# Patient Record
Sex: FEMALE | Employment: UNEMPLOYED | URBAN - METROPOLITAN AREA
[De-identification: names, ages, dates, MRNs, and addresses within clinical notes are randomized per-mention and may not be internally consistent; named-entity substitution may affect disease eponyms.]

---

## 2024-08-26 ENCOUNTER — NURSE TRIAGE (OUTPATIENT)
Age: 39
End: 2024-08-26

## 2024-08-26 NOTE — TELEPHONE ENCOUNTER
Regarding: small lump in vulva  ----- Message from Gracie RAMIREZ sent at 8/26/2024 11:03 AM EDT -----  I meant .    ----- Message from Gracie RAMIREZ sent at 8/26/2024 11:03 AM EDT -----  Np requested .      Patient has small lump in vulva area.      New to area.My soonest in all location with her is 9/20. I scheduled

## 2024-08-28 ENCOUNTER — NURSE TRIAGE (OUTPATIENT)
Age: 39
End: 2024-08-28

## 2024-08-28 NOTE — TELEPHONE ENCOUNTER
"Delivered baby 3 months ago ( ) , last bleeding she had was 5 weeks ago, exclusively breastfeeding. Used midwife for delivery, this provider does not do routine GYN care.  1 week ago noticed small lump on right side of the outer most fold of the vagina. Lump size about size of top of 2 pins. Diagnosed with   Lichen sclerosis before puberty for itchiness has itching  on and off several years.  Had bx 6 years ago and came back negative for lichen sclerosis so not sure if she has or doesn't have it. Generally she has 1 small lump area that has not changed for long time and now this new lump on right side. Denies any vaginal discharge, no fever, no pain. No urinary symptoms. Uses coconut oil and warm moist heat as needed for comfort.   First available new patient appointment scheduled 24. Patient advised to monitor and call back any concerning symptoms.             Reason for Disposition   All other vaginal symptoms (Exception: feels like prior yeast infection, minor abrasion, mild rash < 24 hour duration, mild itching)    Answer Assessment - Initial Assessment Questions  1. SYMPTOM: \"What's the main symptom you're concerned about?\" (e.g., pain, itching, dryness)      Small vaginal lump  2. LOCATION: \"Where is the  lump located?\" (e.g., inside/outside, left/right)      Right outside of   3. ONSET: \"When did the  symptoms  start?\"      1 week ago   4. PAIN: \"Is there any pain?\" If Yes, ask: \"How bad is it?\" (Scale: 1-10; mild, moderate, severe)      no  5. ITCHING: \"Is there any itching?\" If Yes, ask: \"How bad is it?\" (Scale: 1-10; mild, moderate, severe)      Sometimes external  6. CAUSE: \"What do you think is causing the discharge?\" \"Have you had the same problem before? What happened then?\"      denies  7. OTHER SYMPTOMS: \"Do you have any other symptoms?\" (e.g., fever, itching, vaginal bleeding, pain with urination, injury to genital area, vaginal foreign body)      denies  8. PREGNANCY: \"Is there any chance " "you are pregnant?\" \"When was your last menstrual period?\"      Delivered baby 3 months ago, vaginal delivery, irregular bleeding    Protocols used: Vaginal Symptoms-ADULT-[place]    "

## 2024-09-20 ENCOUNTER — OFFICE VISIT (OUTPATIENT)
Dept: OBGYN CLINIC | Facility: CLINIC | Age: 39
End: 2024-09-20
Payer: COMMERCIAL

## 2024-09-20 VITALS — DIASTOLIC BLOOD PRESSURE: 60 MMHG | SYSTOLIC BLOOD PRESSURE: 100 MMHG | HEIGHT: 63 IN

## 2024-09-20 DIAGNOSIS — Z12.4 PAP SMEAR FOR CERVICAL CANCER SCREENING: Primary | ICD-10-CM

## 2024-09-20 PROCEDURE — G0145 SCR C/V CYTO,THINLAYER,RESCR: HCPCS | Performed by: OBSTETRICS & GYNECOLOGY

## 2024-09-20 PROCEDURE — 99203 OFFICE O/P NEW LOW 30 MIN: CPT | Performed by: OBSTETRICS & GYNECOLOGY

## 2024-09-20 PROCEDURE — G0476 HPV COMBO ASSAY CA SCREEN: HCPCS | Performed by: OBSTETRICS & GYNECOLOGY

## 2024-09-20 RX ORDER — MULTIVITAMIN
1 TABLET ORAL DAILY
COMMUNITY

## 2024-09-20 NOTE — PROGRESS NOTES
"[facility]  [address], [address], [place]    Assessment/Plan:  No problem-specific Assessment & Plan notes found for this encounter.    Diagnoses and all orders for this visit:    Pap smear for cervical cancer screening  -     Liquid-based pap, screening    Other orders  -     Multiple Vitamin (multivitamin) tablet; Take 1 tablet by mouth daily          Subjective:   Francie Smith is a 39 y.o.  female.  CC: vulvar lump    HPI: HPI  Patient presents with complaints of a vulvar lump. She states she notices in right labia. Not painful, no lesions. She also would like a pap, since her last was . She states she was diagnosed with lichen sclerosus when she was prepubertal. Has noticed increased itching lately. Discussed possible association with breast feeding. We also discussed birth control options.    ROS: Review of Systems   Constitutional: Negative.    HENT: Negative.     Eyes: Negative.    Respiratory: Negative.     Cardiovascular: Negative.    Gastrointestinal: Negative.    Genitourinary:  Positive for vaginal pain.   Musculoskeletal: Negative.    All other systems reviewed and are negative.      PFSH: The following portions of the patient's history were reviewed and updated as appropriate: allergies, current medications, past family history, past medical history, obstetric history, gynecologic history, past social history, past surgical history and problem list.       Objective:  /60   Ht 5' 3\" (1.6 m)   LMP 2024 (Exact Date)    Physical Exam  Vitals reviewed.   Constitutional:       Appearance: Normal appearance.   Cardiovascular:      Rate and Rhythm: Normal rate.   Pulmonary:      Effort: Pulmonary effort is normal. No respiratory distress.   Genitourinary:     General: Normal vulva.      Exam position: Lithotomy position.      Labia:         Right: No rash or lesion.         Left: No rash or lesion.       Vagina: Normal.      Cervix: Normal. "   Neurological:      Mental Status: She is alert.   Psychiatric:         Mood and Affect: Mood normal.         Behavior: Behavior normal.

## 2024-09-24 LAB
HPV HR 12 DNA CVX QL NAA+PROBE: NEGATIVE
HPV16 DNA CVX QL NAA+PROBE: NEGATIVE
HPV18 DNA CVX QL NAA+PROBE: NEGATIVE

## 2024-09-27 LAB
LAB AP GYN PRIMARY INTERPRETATION: NORMAL
Lab: NORMAL